# Patient Record
Sex: MALE | Race: WHITE | NOT HISPANIC OR LATINO | Employment: FULL TIME | URBAN - METROPOLITAN AREA
[De-identification: names, ages, dates, MRNs, and addresses within clinical notes are randomized per-mention and may not be internally consistent; named-entity substitution may affect disease eponyms.]

---

## 2017-12-20 ENCOUNTER — ALLSCRIPTS OFFICE VISIT (OUTPATIENT)
Dept: OTHER | Facility: OTHER | Age: 42
End: 2017-12-20

## 2017-12-20 LAB
BILIRUB UR QL STRIP: NORMAL
CLARITY UR: NORMAL
COLOR UR: YELLOW
GLUCOSE (HISTORICAL): NORMAL
HGB UR QL STRIP.AUTO: NORMAL
KETONES UR STRIP-MCNC: NORMAL MG/DL
LEUKOCYTE ESTERASE UR QL STRIP: NORMAL
NITRITE UR QL STRIP: NORMAL
PH UR STRIP.AUTO: 5 [PH]
PROT UR STRIP-MCNC: NORMAL MG/DL
SP GR UR STRIP.AUTO: 1.01
UROBILINOGEN UR QL STRIP.AUTO: 0.2

## 2018-01-07 ENCOUNTER — LAB CONVERSION - ENCOUNTER (OUTPATIENT)
Dept: OTHER | Facility: OTHER | Age: 43
End: 2018-01-07

## 2018-01-07 LAB
25(OH)D3 SERPL-MCNC: 13 NG/ML (ref 30–100)
A/G RATIO (HISTORICAL): 2 (CALC) (ref 1–2.5)
ALBUMIN SERPL BCP-MCNC: 4.2 G/DL (ref 3.6–5.1)
ALP SERPL-CCNC: 50 U/L (ref 40–115)
ALT SERPL W P-5'-P-CCNC: 17 U/L (ref 9–46)
AST SERPL W P-5'-P-CCNC: 16 U/L (ref 10–40)
BASOPHILS # BLD AUTO: 1 %
BASOPHILS # BLD AUTO: 42 CELLS/UL (ref 0–200)
BILIRUB SERPL-MCNC: 0.5 MG/DL (ref 0.2–1.2)
BUN SERPL-MCNC: 10 MG/DL (ref 7–25)
BUN/CREA RATIO (HISTORICAL): NORMAL (CALC) (ref 6–22)
CALCIUM SERPL-MCNC: 9.2 MG/DL (ref 8.6–10.3)
CHLORIDE SERPL-SCNC: 103 MMOL/L (ref 98–110)
CHOLEST SERPL-MCNC: 172 MG/DL
CHOLEST/HDLC SERPL: 4 (CALC)
CO2 SERPL-SCNC: 30 MMOL/L (ref 20–31)
CREAT SERPL-MCNC: 1 MG/DL (ref 0.6–1.35)
DEPRECATED RDW RBC AUTO: 12.7 % (ref 11–15)
EGFR AFRICAN AMERICAN (HISTORICAL): 106 ML/MIN/1.73M2
EGFR-AMERICAN CALC (HISTORICAL): 91 ML/MIN/1.73M2
EOSINOPHIL # BLD AUTO: 1.7 %
EOSINOPHIL # BLD AUTO: 71 CELLS/UL (ref 15–500)
GAMMA GLOBULIN (HISTORICAL): 2.1 G/DL (CALC) (ref 1.9–3.7)
GLUCOSE (HISTORICAL): 91 MG/DL (ref 65–99)
HCT VFR BLD AUTO: 42.2 % (ref 38.5–50)
HDLC SERPL-MCNC: 43 MG/DL
HGB BLD-MCNC: 14.5 G/DL (ref 13.2–17.1)
LDL CHOLESTEROL (HISTORICAL): 114 MG/DL (CALC)
LYMPHOCYTES # BLD AUTO: 1424 CELLS/UL (ref 850–3900)
LYMPHOCYTES # BLD AUTO: 33.9 %
MCH RBC QN AUTO: 28.4 PG (ref 27–33)
MCHC RBC AUTO-ENTMCNC: 34.4 G/DL (ref 32–36)
MCV RBC AUTO: 82.7 FL (ref 80–100)
MONOCYTES # BLD AUTO: 332 CELLS/UL (ref 200–950)
MONOCYTES (HISTORICAL): 7.9 %
NEUTROPHILS # BLD AUTO: 2331 CELLS/UL (ref 1500–7800)
NEUTROPHILS # BLD AUTO: 55.5 %
NON-HDL-CHOL (CHOL-HDL) (HISTORICAL): 129 MG/DL (CALC)
PLATELET # BLD AUTO: 153 THOUSAND/UL (ref 140–400)
PMV BLD AUTO: 11.6 FL (ref 7.5–12.5)
POTASSIUM SERPL-SCNC: 4.2 MMOL/L (ref 3.5–5.3)
RBC # BLD AUTO: 5.1 MILLION/UL (ref 4.2–5.8)
SODIUM SERPL-SCNC: 138 MMOL/L (ref 135–146)
TOTAL PROTEIN (HISTORICAL): 6.3 G/DL (ref 6.1–8.1)
TRIGL SERPL-MCNC: 64 MG/DL
TSH SERPL DL<=0.05 MIU/L-ACNC: 2.35 MIU/L (ref 0.4–4.5)
WBC # BLD AUTO: 4.2 THOUSAND/UL (ref 3.8–10.8)

## 2018-01-10 NOTE — RESULT NOTES
Message   please let him know all of his lab work was completely normal  thanks     Verified Results  (1) COMPREHENSIVE METABOLIC PANEL 56SPW7502 90:17IB Dilip Rayo     Test Name Result Flag Reference   GLUCOSE 85 mg/dL  65-99   Fasting reference interval   UREA NITROGEN (BUN) 11 mg/dL  7-25   CREATININE 1 07 mg/dL  0 60-1 35   eGFR NON-AFR   AMERICAN 85 mL/min/1 73m2  > OR = 60   eGFR AFRICAN AMERICAN 98 mL/min/1 73m2  > OR = 60   BUN/CREATININE RATIO   0-82   NOT APPLICABLE (calc)   SODIUM 138 mmol/L  135-146   POTASSIUM 4 2 mmol/L  3 5-5 3   CHLORIDE 103 mmol/L     CARBON DIOXIDE 29 mmol/L  20-31   CALCIUM 8 8 mg/dL  8 6-10 3   PROTEIN, TOTAL 6 1 g/dL  6 1-8 1   ALBUMIN 4 2 g/dL  3 6-5 1   GLOBULIN 1 9 g/dL (calc)  1 9-3 7   ALBUMIN/GLOBULIN RATIO 2 2 (calc)  1 0-2 5   BILIRUBIN, TOTAL 0 5 mg/dL  0 2-1 2   ALKALINE PHOSPHATASE 53 U/L     AST 15 U/L  10-40   ALT 14 U/L  9-46

## 2018-01-11 ENCOUNTER — GENERIC CONVERSION - ENCOUNTER (OUTPATIENT)
Dept: OTHER | Facility: OTHER | Age: 43
End: 2018-01-11

## 2018-01-13 NOTE — PROGRESS NOTES
Assessment    1  Encounter for preventive health examination (V70 0) (Z00 00)    Plan  Dyslipidemia, Health Maintenance    · (1) CBC/ PLT (NO DIFF); Status:Active; Requested for:10Aug2016;    · (1) COMPREHENSIVE METABOLIC PANEL; Status:Active; Requested for:10Aug2016;    · (1) LIPID PANEL, FASTING; Status:Active; Requested for:10Aug2016;   PMH: History of upper respiratory infection    · Amoxicillin 875 MG Oral Tablet   · Ipratropium Bromide 0 06 % Nasal Solution    Discussion/Summary  Impression: health maintenance visit  Currently, he eats a healthy diet  Prostate cancer screening: PSA is not indicated  Testicular cancer screening: testicular cancer screening is not indicated  Colorectal cancer screening: colorectal cancer screening is not indicated  Screening lab work includes glucose and lipid profile  The immunizations are up to date  Patient discussion: discussed with the patient  Rec annual f/u  Chief Complaint  well exam today      History of Present Illness  HPI: well visit  no new issues  no complaints      Review of Systems    Constitutional: No fever or chills, feels well, no tiredness, no recent weight gain or weight loss  Eyes: No complaints of eye pain, no red eyes, no discharge from eyes, no itchy eyes  ENT: no complaints of earache, no hearing loss, no nosebleeds, no nasal discharge, no sore throat, no hoarseness  Cardiovascular: No complaints of slow heart rate, no fast heart rate, no chest pain, no palpitations, no leg claudication, no lower extremity  Respiratory: No complaints of shortness of breath, no wheezing, no cough, no SOB on exertion, no orthopnea or PND  Gastrointestinal: No complaints of abdominal pain, no constipation, no nausea or vomiting, no diarrhea or bloody stools  Genitourinary: No complaints of dysuria, no incontinence, no hesitancy, no nocturia, no genital lesion, no testicular pain     Musculoskeletal: No complaints of arthralgia, no myalgias, no joint swelling or stiffness, no limb pain or swelling  Integumentary: No complaints of skin rash or skin lesions, no itching, no skin wound, no dry skin  Neurological: No compliants of headache, no confusion, no convulsions, no numbness or tingling, no dizziness or fainting, no limb weakness, no difficulty walking  Psychiatric: Is not suicidal, no sleep disturbances, no anxiety or depression, no change in personality, no emotional problems  Endocrine: No complaints of proptosis, no hot flashes, no muscle weakness, no erectile dysfunction, no deepening of the voice, no feelings of weakness  Hematologic/Lymphatic: No complaints of swollen glands, no swollen glands in the neck, does not bleed easily, no easy bruising  Active Problems    1  Dyslipidemia (272 4) (E78 5)    Past Medical History    · History of Asthma (493 90) (J45 909)   · Gastroenteritis (558 9) (K52 9)   · History of hematuria (V13 09) (Z87 448)   · History of upper respiratory infection (V12 09) (Z87 09)    Family History  Father    · Family history of Diabetes Mellitus (V18 0)   · Family history of Hypertension (V17 49)    Social History    · Never A Smoker   · Never Drank Alcohol    Current Meds   1  Amoxicillin 875 MG Oral Tablet; TAKE 1 TABLET EVERY 12 HOURS DAILY; Therapy: [id number] to (Evaluate:27Mar2015); Last Rx:17Mar2015 Ordered   2  Ipratropium Bromide 0 06 % Nasal Solution; USE 2 SPRAYS IN EACH NOSTRIL 3   TIMES DAILY; Therapy: 93TYV3228 to (Evaluate:27Mar2015); Last Rx:17Mar2015 Ordered    Allergies    1  No Known Drug Allergies    Vitals   Recorded: 10Aug2016 05:56PM Recorded: 98LXJ8799 14:12OJ   Systolic 234    Diastolic 78    Heart Rate 72    Respiration 12    Temperature 97 2 F    Height 5 ft 10 in 5 ft 10 in   Weight 167 lb  167 lb    BMI Calculated 23 96 23 96   BSA Calculated 1 94 1 94     Physical Exam    Constitutional   General appearance: No acute distress, well appearing and well nourished      Head and Face   Head and face: Normal     Eyes   Conjunctiva and lids: No erythema, swelling or discharge  Pupils and irises: Equal, round, reactive to light  Ears, Nose, Mouth, and Throat   External inspection of ears and nose: Normal     Otoscopic examination: Tympanic membranes translucent with normal light reflex  Canals patent without erythema  Lips, teeth, and gums: Normal, good dentition  Oropharynx: Normal with no erythema, edema, exudate or lesions  Neck   Neck: Supple, symmetric, trachea midline, no masses  Pulmonary   Respiratory effort: No increased work of breathing or signs of respiratory distress  Auscultation of lungs: Clear to auscultation  Cardiovascular   Auscultation of heart: Normal rate and rhythm, normal S1 and S2, no murmurs  Examination of extremities for edema and/or varicosities: Normal     Abdomen   Abdomen: Non-tender, no masses  Liver and spleen: No hepatomegaly or splenomegaly  Lymphatic   Palpation of lymph nodes in neck: No lymphadenopathy  Musculoskeletal   Gait and station: Normal     Inspection/palpation of digits and nails: Normal without clubbing or cyanosis  Skin   Skin and subcutaneous tissue: Normal without rashes or lesions  Psychiatric   Judgment and insight: Normal     Mood and affect: Normal        Results/Data  PHQ-2 Adult Depression Screening 10Aug2016 06:45PM User, [place] [facility]     Test Name Result Flag Reference   PHQ-2 Adult Depression Score 0     Over the last two weeks, how often have you been bothered by any of the following problems?   Little interest or pleasure in doing things: Not at all - 0  Feeling down, depressed, or hopeless: Not at all - 0   PHQ-2 Adult Depression Screening Negative       eCalcs - Health Calculators 78VNP5698 06:44PM User, [employer]     Test Name Result Flag Reference   SBIRT Screen - Tobacco Screening Result Negative         Signatures   Electronically signed by : Stacey Lee DO; Aug 10 2016  7:33PM EST (Author)

## 2018-01-22 VITALS
DIASTOLIC BLOOD PRESSURE: 78 MMHG | SYSTOLIC BLOOD PRESSURE: 118 MMHG | HEART RATE: 70 BPM | HEIGHT: 70 IN | RESPIRATION RATE: 12 BRPM | TEMPERATURE: 97 F | WEIGHT: 163.25 LBS | BODY MASS INDEX: 23.37 KG/M2

## 2018-01-23 NOTE — RESULT NOTES
Verified Results  Urine Dip Non-Automated- POC [id number] 07:11PM Flower Herrera     Test Name Result Flag Reference   Color Yellow     Clarity Transparent     Leukocytes neg     Nitrite neg     Blood neg     Bilirubin neg     Urobilinogen 0 2     Protein neg     Ph 5 0     Specific Gravity 1 010     Ketone neg     Glucose neg         Plan  Dyslipidemia    · (1) LIPID PANEL FASTING W DIRECT LDL REFLEX; Status:Active; Requested  for:44Ala8119;   Fatigue    · (1) CBC/PLT/DIFF; Status:Active; Requested for:27Qzr2086;    · (1) COMPREHENSIVE METABOLIC PANEL; Status:Active; Requested for:34Exl0122;    · (1) TSH WITH FT4 REFLEX; Status:Active; Requested for:55Cyn6224;    · (1) VITAMIN D 25-HYDROXY; Status:Active; Requested for:32Yva6852;    Health Maintenance    · Urine Dip Non-Automated- POC; Status:Complete;   Done: 25ZDJ7024 07:11PM

## 2018-01-23 NOTE — PROGRESS NOTES
Assessment    1  Encounter for preventive health examination (V70 0) (Z00 00)   2  Fatigue (780 79) (R53 83)   3  Dyslipidemia (272 4) (E78 5)    Plan  Dyslipidemia    · (1) LIPID PANEL FASTING W DIRECT LDL REFLEX; Status:Active; Requested  for:68Owe1745;   Fatigue    · (1) CBC/PLT/DIFF; Status:Active; Requested for:04Emp4989;    · (1) COMPREHENSIVE METABOLIC PANEL; Status:Active; Requested for:40Gen4204;    · (1) TSH WITH FT4 REFLEX; Status:Active; Requested for:01Lbh9464;    · (1) VITAMIN D 25-HYDROXY; Status:Active; Requested for:73Kgo5013; Health Maintenance    · Urine Dip Non-Automated- POC; Status:Complete;   Done: 06FUB3486 07:11PM    Discussion/Summary  Impression: health maintenance visit  Currently, he eats a healthy diet  Screening lab work includes glucose, lipid profile and 25-hydroxyvitamin D  He was advised to be evaluated by an ophthalmologist and a dentist  Advice and education were given regarding nutrition, aerobic exercise, vitamin D supplements and sunscreen use  40-year-old male here for routine health maintenance exam  Patient states he is doing well  He does go to the gym regularly  Maintains a well-balanced diet as well  Denies any sleep disturbance  Denies any complaints today  Will obtain routine blood work  Have recommended continuing with maintaining well balanced diet, getting adequate amount of sleep  Handout for Mediterranean diet provided  Will call with blood work results  Follow-up for yearly routine health maintenance exam or sooner if needed  The patient was counseled regarding diagnostic results, instructions for management, risk factor reductions, prognosis, patient and family education, impressions, risks and benefits of treatment options, importance of compliance with treatment  The treatment plan was reviewed with the patient/guardian   The patient/guardian understands and agrees with the treatment plan      Chief Complaint  pt is here for physical, med and allergies reviewed      History of Present Illness  HM, Adult Male: The patient is being seen for a health maintenance evaluation  General Health: The patient's health since the last visit is described as good  He has regular dental visits  He denies vision problems  Lifestyle:  He consumes a diverse and healthy diet  He does not have any weight concerns  He exercises regularly  He does not use tobacco  He denies alcohol use  He denies drug use  Screening:   HPI: 61-year-old male here for routine health maintenance exam  Patient states he is doing well  He does go to the gym regularly  Maintains a well-balanced diet as well  Denies any sleep disturbance  Denies any complaints today  Review of Systems    Constitutional: recent weight loss  Eyes: No complaints of eye pain, no red eyes, no discharge from eyes, no itchy eyes  ENT: no complaints of earache, no hearing loss, no nosebleeds, no nasal discharge, no sore throat, no hoarseness  Cardiovascular: No complaints of slow heart rate, no fast heart rate, no chest pain, no palpitations, no leg claudication, no lower extremity, no chest pain, no palpitations and no extremity edema  Respiratory: No complaints of shortness of breath, no wheezing, no cough, no SOB on exertion, no orthopnea or PND and no shortness of breath  Gastrointestinal: No complaints of abdominal pain, no constipation, no nausea or vomiting, no diarrhea or bloody stools, no abdominal pain and no constipation  Psychiatric: no depression  Active Problems    1   Dyslipidemia (272 4) (E78 5)    Past Medical History    · History of Asthma (493 90) (J45 909)   · Gastroenteritis (558 9) (K52 9)   · History of hematuria (V13 09) (Z87 448)   · History of upper respiratory infection (V12 09) (Z87 09)    Family History  Mother    · Family history of breast cancer (V16 3) (Z80 3)  Father    · Family history of Diabetes Mellitus (V18 0)   · Family history of Hypertension (V17 49)    Social History    · Coffee   · 3 cups   · Never A Smoker   · Never Drank Alcohol    Allergies    1  No Known Drug Allergies    Vitals   Recorded: 20Dec2017 06:29PM   Temperature 97 F   Heart Rate 70   Respiration 12   Systolic 366   Diastolic 78   Height 5 ft 10 in   Weight 163 lb 4 oz   BMI Calculated 23 42   BSA Calculated 1 91     Physical Exam    Constitutional   General appearance: No acute distress, well appearing and well nourished  Eyes   Conjunctiva and lids: No erythema, swelling or discharge  Pupils and irises: Equal, round, reactive to light  Ears, Nose, Mouth, and Throat   External inspection of ears and nose: Normal     Otoscopic examination: Tympanic membranes translucent with normal light reflex  Canals patent without erythema  Oropharynx: Normal with no erythema, edema, exudate or lesions  Neck   Neck: Supple, symmetric, trachea midline, no masses  Thyroid: Normal, no thyromegaly  Pulmonary   Respiratory effort: No increased work of breathing or signs of respiratory distress  Auscultation of lungs: Clear to auscultation  Cardiovascular   Auscultation of heart: Normal rate and rhythm, normal S1 and S2, no murmurs  Examination of extremities for edema and/or varicosities: Normal     Abdomen   Abdomen: Non-tender, no masses  Liver and spleen: No hepatomegaly or splenomegaly  Lymphatic   Palpation of lymph nodes in neck: No lymphadenopathy  Neurologic   Cranial nerves: Cranial nerves 2-12 intact      Psychiatric   Mood and affect: Normal        Results/Data  Urine Dip Non-Automated- POC [id number] 07:11PM Flower [facility]     Test Name Result Flag Reference   Color Yellow     Clarity Transparent     Leukocytes neg     Nitrite neg     Blood neg     Bilirubin neg     Urobilinogen 0 2     Protein neg     Ph 5 0     Specific Gravity 1 010     Ketone neg     Glucose neg       PHQ-2 Adult Depression Screening 40Gms2188 06:37PM User, [facility]     Test Name Result Flag Reference   PHQ-2 Adult Depression Score 0     Over the last two weeks, how often have you been bothered by any of the following problems?   Little interest or pleasure in doing things: Not at all - 0  Feeling down, depressed, or hopeless: Not at all - 0   PHQ-2 Adult Depression Screening Negative         Signatures   Electronically signed by : Angel Jimenez MD; Dec 21 2017  1:49PM EST                       (Author)

## 2018-02-26 NOTE — RESULT NOTES
Verified Results  (1) CBC/PLT/DIFF [id number] 09:45AM Flower Herrera     Test Name Result Flag Reference   WHITE BLOOD CELL COUNT 4 2 Thousand/uL  3 8-10 8   RED BLOOD CELL COUNT 5 10 Million/uL  4 20-5 80   HEMOGLOBIN 14 5 g/dL  13 2-17 1   HEMATOCRIT 42 2 %  38 5-50 0   MCV 82 7 fL  80 0-100 0   MCH 28 4 pg  27 0-33 0   MCHC 34 4 g/dL  32 0-36 0   RDW 12 7 %  11 0-15 0   PLATELET COUNT 924 Thousand/uL  140-400   ABSOLUTE NEUTROPHILS 2331 cells/uL  1955-8902   ABSOLUTE LYMPHOCYTES 1424 cells/uL  850-3900   ABSOLUTE MONOCYTES 332 cells/uL  200-950   ABSOLUTE EOSINOPHILS 71 cells/uL     ABSOLUTE BASOPHILS 42 cells/uL  0-200   NEUTROPHILS 55 5 %     LYMPHOCYTES 33 9 %     MONOCYTES 7 9 %     EOSINOPHILS 1 7 %     BASOPHILS 1 0 %     MPV 11 6 fL  7 5-12 5     (1) COMPREHENSIVE METABOLIC PANEL [id number] 91:93GS Flower Herrera     Test Name Result Flag Reference   GLUCOSE 91 mg/dL  65-99   Fasting reference interval   UREA NITROGEN (BUN) 10 mg/dL  7-25   CREATININE 1 00 mg/dL  0 60-1 35   eGFR NON-AFR   AMERICAN 91 mL/min/1 73m2  > OR = 60   eGFR AFRICAN AMERICAN 106 mL/min/1 73m2  > OR = 60   BUN/CREATININE RATIO   0-36   NOT APPLICABLE (calc)   SODIUM 138 mmol/L  135-146   POTASSIUM 4 2 mmol/L  3 5-5 3   CHLORIDE 103 mmol/L     CARBON DIOXIDE 30 mmol/L  20-31   CALCIUM 9 2 mg/dL  8 6-10 3   PROTEIN, TOTAL 6 3 g/dL  6 1-8 1   ALBUMIN 4 2 g/dL  3 6-5 1   GLOBULIN 2 1 g/dL (calc)  1 9-3 7   ALBUMIN/GLOBULIN RATIO 2 0 (calc)  1 0-2 5   BILIRUBIN, TOTAL 0 5 mg/dL  0 2-1 2   ALKALINE PHOSPHATASE 50 U/L     AST 16 U/L  10-40   ALT 17 U/L  9-46     (Q) LIPID PANEL WITH REFLEX TO DIRECT LDL [id number] 09:45AM Flower Herrera     Test Name Result Flag Reference   CHOLESTEROL, TOTAL 172 mg/dL  <200   HDL CHOLESTEROL 43 mg/dL  >94   TRIGLICERIDES 64 mg/dL  <600   LDL-CHOLESTEROL 114 mg/dL (calc) H    Reference range: <100     Desirable range <100 mg/dL for patients with CHD or  diabetes and <70 mg/dL for diabetic patients with  known heart disease  LDL-C is now calculated using the Venu-Pate   calculation, which is a validated novel method providing   better accuracy than the Friedewald equation in the   estimation of LDL-C  Buck Francisco  Sarah Tam  7961870(49): 3838-3302   (http://[employer]/faq/GGQ559)   CHOL/HDLC RATIO 4 0 (calc)  <5 0   NON HDL CHOLESTEROL 129 mg/dL (calc)  <130   For patients with diabetes plus 1 major ASCVD risk   factor, treating to a non-HDL-C goal of <100 mg/dL   (LDL-C of <70 mg/dL) is considered a therapeutic   option  *(Q) VITAMIN D, 25-HYDROXY, LC/MS/MS [id number] 09:45AM Flower Herrera     Test Name Result Flag Reference   VITAMIN D, 25-OH, TOTAL 13 ng/mL L    Vitamin D Status         25-OH Vitamin D:     Deficiency:                    <20 ng/mL  Insufficiency:             20 - 29 ng/mL  Optimal:                 > or = 30 ng/mL     For 25-OH Vitamin D testing on patients on   D2-supplementation and patients for whom quantitation   of D2 and D3 fractions is required, the QuestAssureD(TM)  25-OH VIT D, (D2,D3), LC/MS/MS is recommended: order   code 32208 (patients >2yrs)  For more information on this test, go to:  http://[employer]/faq/JTU954  (This link is being provided for   informational/educational purposes only )     (Q) TSH, 3RD GENERATION W/REFLEX TO FT4 60RUT6367 09:45AM Flower Herrera   REPORT COMMENT:  FASTING:YES     Test Name Result Flag Reference   TSH W/REFLEX TO FT4 2 35 mIU/L  0 40-4 50

## 2019-09-28 ENCOUNTER — OFFICE VISIT (OUTPATIENT)
Dept: FAMILY MEDICINE CLINIC | Facility: CLINIC | Age: 44
End: 2019-09-28
Payer: COMMERCIAL

## 2019-09-28 VITALS
BODY MASS INDEX: 24.05 KG/M2 | HEART RATE: 82 BPM | OXYGEN SATURATION: 98 % | TEMPERATURE: 97 F | SYSTOLIC BLOOD PRESSURE: 112 MMHG | RESPIRATION RATE: 16 BRPM | WEIGHT: 171.8 LBS | DIASTOLIC BLOOD PRESSURE: 68 MMHG | HEIGHT: 71 IN

## 2019-09-28 DIAGNOSIS — Z87.448 HISTORY OF HEMATURIA: ICD-10-CM

## 2019-09-28 DIAGNOSIS — Z00.00 WELL ADULT EXAM: Primary | ICD-10-CM

## 2019-09-28 DIAGNOSIS — R53.83 FATIGUE, UNSPECIFIED TYPE: ICD-10-CM

## 2019-09-28 DIAGNOSIS — Z23 NEED FOR INFLUENZA VACCINATION: ICD-10-CM

## 2019-09-28 DIAGNOSIS — E55.9 VITAMIN D DEFICIENCY: ICD-10-CM

## 2019-09-28 DIAGNOSIS — Z13.220 LIPID SCREENING: ICD-10-CM

## 2019-09-28 DIAGNOSIS — Z83.3 FAMILY HISTORY OF DIABETES MELLITUS: ICD-10-CM

## 2019-09-28 PROCEDURE — 90682 RIV4 VACC RECOMBINANT DNA IM: CPT | Performed by: FAMILY MEDICINE

## 2019-09-28 PROCEDURE — 99396 PREV VISIT EST AGE 40-64: CPT | Performed by: FAMILY MEDICINE

## 2019-09-28 PROCEDURE — 90471 IMMUNIZATION ADMIN: CPT | Performed by: FAMILY MEDICINE

## 2019-09-28 NOTE — PROGRESS NOTES
Assessment/Plan:  Patient is 43-year-old male seen to establish care  She is here for a well visit  He had no concerns today  He exercises twice a week  He he could increase this  He was given a flu shot at the visit  I ordered fasting blood work  I noticed  That his last blood work showed a vitamin D of less than 20  I recommended that we recheck this with blood work that I have ordered  He is not taking any supplements but I did recommend he start a 2000 International Units to 5000 International Units supplement daily until we get his blood work back  He should return in a year for a well visit or as needed  Diagnoses and all orders for this visit:    Well adult exam  -     Lipid Panel with Direct LDL reflex; Future  -     Comprehensive metabolic panel; Future  -     TSH, 3rd generation with Free T4 reflex; Future  -     CBC and differential; Future    Need for influenza vaccination  -     influenza vaccine, 6511-5656, quadrivalent, recombinant, PF, 0 5 mL, for patients 18 yr+ (FLUBLOK)    Lipid screening  -     Lipid Panel with Direct LDL reflex; Future    Family history of diabetes mellitus  -     Comprehensive metabolic panel; Future    Vitamin D deficiency  -     Vitamin D 25 hydroxy; Future    Fatigue, unspecified type  -     TSH, 3rd generation with Free T4 reflex; Future  -     CBC and differential; Future  -     Vitamin D 25 hydroxy; Future    History of hematuria  -     UA w Reflex to Microscopic w Reflex to Culture - Clinic Collect; Future          Subjective:   Chief Complaint   Patient presents with   [address]     NP    Physical Exam     Annual        Patient ID: Author Shania is a 53 y o  male  Patient is here for well adult visit  He does exercise twice a week  Feels that he eats nutritiously  He has no significant medical history  He did have 1 episode of hematuria which resolved  He has low vitamin-D    He has had no surgery  He denies smoking and alcohol use       The following portions of the patient's history were reviewed and updated as appropriate: allergies, current medications, past family history, past medical history, past social history, past surgical history and problem list       Family History   Problem Relation Age of Onset    Breast cancer Mother     Diabetes Father     Hypertension Father      No Known Allergies       Review of Systems   Constitutional: Negative for chills, fatigue, fever and unexpected weight change  HENT: Negative for ear discharge, hearing loss and sore throat  Eyes: Negative  Respiratory: Negative for cough, chest tightness, shortness of breath and wheezing  Cardiovascular: Negative for chest pain, palpitations and leg swelling  Gastrointestinal: Negative for abdominal pain, constipation, diarrhea, nausea and vomiting  Endocrine: Negative  Genitourinary: Negative  Gets up x 1 during night  Musculoskeletal: Negative for arthralgias and back pain  Skin: Negative  Neurological: Negative for dizziness, syncope and headaches  Hematological: Negative  Psychiatric/Behavioral: Negative for sleep disturbance  The patient is not nervous/anxious  Objective:      /68 (BP Location: Left arm, Patient Position: Sitting, Cuff Size: Standard)   Pulse 82   Temp (!) 97 °F (36 1 °C) (Tympanic)   Resp 16   Ht 5' 10 8" (1 798 m)   Wt 77 9 kg (171 lb 12 8 oz)   SpO2 98%   BMI 24 10 kg/m²          Physical Exam   Constitutional: He is oriented to person, place, and time  He appears well-developed and well-nourished  HENT:   Head: Normocephalic  Right Ear: Tympanic membrane normal    Left Ear: Tympanic membrane normal    Nose: Nose normal    Mouth/Throat: Oropharynx is clear and moist and mucous membranes are normal    Eyes: Pupils are equal, round, and reactive to light  Neck: Normal range of motion  No thyromegaly present     Cardiovascular: Normal rate, regular rhythm, normal heart sounds and intact distal pulses  Pulmonary/Chest: Effort normal and breath sounds normal    Abdominal: Soft  Bowel sounds are normal  There is no tenderness  Musculoskeletal: Normal range of motion  He exhibits no edema  Lymphadenopathy:     He has no cervical adenopathy  Neurological: He is alert and oriented to person, place, and time  He has normal reflexes  Skin: Skin is warm and dry  Psychiatric: He has a normal mood and affect  Nursing note and vitals reviewed

## 2019-10-26 ENCOUNTER — APPOINTMENT (OUTPATIENT)
Dept: LAB | Facility: CLINIC | Age: 44
End: 2019-10-26
Payer: COMMERCIAL

## 2019-10-26 DIAGNOSIS — Z13.220 LIPID SCREENING: ICD-10-CM

## 2019-10-26 DIAGNOSIS — Z87.448 HISTORY OF HEMATURIA: ICD-10-CM

## 2019-10-26 DIAGNOSIS — Z83.3 FAMILY HISTORY OF DIABETES MELLITUS: ICD-10-CM

## 2019-10-26 DIAGNOSIS — E55.9 VITAMIN D DEFICIENCY: ICD-10-CM

## 2019-10-26 DIAGNOSIS — Z00.00 WELL ADULT EXAM: ICD-10-CM

## 2019-10-26 DIAGNOSIS — R53.83 FATIGUE, UNSPECIFIED TYPE: ICD-10-CM

## 2019-10-26 LAB
25(OH)D3 SERPL-MCNC: 21.9 NG/ML (ref 30–100)
ALBUMIN SERPL BCP-MCNC: 3.6 G/DL (ref 3.5–5)
ALP SERPL-CCNC: 53 U/L (ref 46–116)
ALT SERPL W P-5'-P-CCNC: 23 U/L (ref 12–78)
ANION GAP SERPL CALCULATED.3IONS-SCNC: 4 MMOL/L (ref 4–13)
AST SERPL W P-5'-P-CCNC: 14 U/L (ref 5–45)
BASOPHILS # BLD AUTO: 0.07 THOUSANDS/ΜL (ref 0–0.1)
BASOPHILS NFR BLD AUTO: 2 % (ref 0–1)
BILIRUB SERPL-MCNC: 0.32 MG/DL (ref 0.2–1)
BILIRUB UR QL STRIP: NEGATIVE
BUN SERPL-MCNC: 13 MG/DL (ref 5–25)
CALCIUM SERPL-MCNC: 8.8 MG/DL (ref 8.3–10.1)
CHLORIDE SERPL-SCNC: 106 MMOL/L (ref 100–108)
CHOLEST SERPL-MCNC: 175 MG/DL (ref 50–200)
CLARITY UR: CLEAR
CO2 SERPL-SCNC: 28 MMOL/L (ref 21–32)
COLOR UR: YELLOW
CREAT SERPL-MCNC: 0.95 MG/DL (ref 0.6–1.3)
EOSINOPHIL # BLD AUTO: 0.09 THOUSAND/ΜL (ref 0–0.61)
EOSINOPHIL NFR BLD AUTO: 2 % (ref 0–6)
ERYTHROCYTE [DISTWIDTH] IN BLOOD BY AUTOMATED COUNT: 13 % (ref 11.6–15.1)
GFR SERPL CREATININE-BSD FRML MDRD: 96 ML/MIN/1.73SQ M
GLUCOSE P FAST SERPL-MCNC: 87 MG/DL (ref 65–99)
GLUCOSE UR STRIP-MCNC: NEGATIVE MG/DL
HCT VFR BLD AUTO: 42.5 % (ref 36.5–49.3)
HDLC SERPL-MCNC: 38 MG/DL
HGB BLD-MCNC: 13.8 G/DL (ref 12–17)
HGB UR QL STRIP.AUTO: NEGATIVE
IMM GRANULOCYTES # BLD AUTO: 0.01 THOUSAND/UL (ref 0–0.2)
IMM GRANULOCYTES NFR BLD AUTO: 0 % (ref 0–2)
KETONES UR STRIP-MCNC: NEGATIVE MG/DL
LDLC SERPL CALC-MCNC: 121 MG/DL (ref 0–100)
LEUKOCYTE ESTERASE UR QL STRIP: NEGATIVE
LYMPHOCYTES # BLD AUTO: 1.58 THOUSANDS/ΜL (ref 0.6–4.47)
LYMPHOCYTES NFR BLD AUTO: 33 % (ref 14–44)
MCH RBC QN AUTO: 27.9 PG (ref 26.8–34.3)
MCHC RBC AUTO-ENTMCNC: 32.5 G/DL (ref 31.4–37.4)
MCV RBC AUTO: 86 FL (ref 82–98)
MONOCYTES # BLD AUTO: 0.38 THOUSAND/ΜL (ref 0.17–1.22)
MONOCYTES NFR BLD AUTO: 8 % (ref 4–12)
NEUTROPHILS # BLD AUTO: 2.62 THOUSANDS/ΜL (ref 1.85–7.62)
NEUTS SEG NFR BLD AUTO: 55 % (ref 43–75)
NITRITE UR QL STRIP: NEGATIVE
NRBC BLD AUTO-RTO: 0 /100 WBCS
PH UR STRIP.AUTO: 7.5 [PH]
PLATELET # BLD AUTO: 162 THOUSANDS/UL (ref 149–390)
PMV BLD AUTO: 11.7 FL (ref 8.9–12.7)
POTASSIUM SERPL-SCNC: 4.2 MMOL/L (ref 3.5–5.3)
PROT SERPL-MCNC: 6.5 G/DL (ref 6.4–8.2)
PROT UR STRIP-MCNC: NEGATIVE MG/DL
RBC # BLD AUTO: 4.94 MILLION/UL (ref 3.88–5.62)
SODIUM SERPL-SCNC: 138 MMOL/L (ref 136–145)
SP GR UR STRIP.AUTO: 1.01 (ref 1–1.03)
TRIGL SERPL-MCNC: 78 MG/DL
TSH SERPL DL<=0.05 MIU/L-ACNC: 2.24 UIU/ML (ref 0.36–3.74)
UROBILINOGEN UR QL STRIP.AUTO: 0.2 E.U./DL
WBC # BLD AUTO: 4.75 THOUSAND/UL (ref 4.31–10.16)

## 2019-10-26 PROCEDURE — 84443 ASSAY THYROID STIM HORMONE: CPT

## 2019-10-26 PROCEDURE — 81003 URINALYSIS AUTO W/O SCOPE: CPT

## 2019-10-26 PROCEDURE — 80061 LIPID PANEL: CPT

## 2019-10-26 PROCEDURE — 80053 COMPREHEN METABOLIC PANEL: CPT

## 2019-10-26 PROCEDURE — 85025 COMPLETE CBC W/AUTO DIFF WBC: CPT

## 2019-10-26 PROCEDURE — 36415 COLL VENOUS BLD VENIPUNCTURE: CPT

## 2019-10-26 PROCEDURE — 82306 VITAMIN D 25 HYDROXY: CPT
